# Patient Record
Sex: FEMALE | Race: WHITE | NOT HISPANIC OR LATINO | Employment: FULL TIME | URBAN - METROPOLITAN AREA
[De-identification: names, ages, dates, MRNs, and addresses within clinical notes are randomized per-mention and may not be internally consistent; named-entity substitution may affect disease eponyms.]

---

## 2023-09-07 ENCOUNTER — APPOINTMENT (OUTPATIENT)
Dept: ULTRASOUND IMAGING | Facility: HOSPITAL | Age: 23
End: 2023-09-07
Payer: COMMERCIAL

## 2023-09-07 ENCOUNTER — HOSPITAL ENCOUNTER (EMERGENCY)
Facility: HOSPITAL | Age: 23
Discharge: HOME | End: 2023-09-07
Attending: EMERGENCY MEDICINE
Payer: COMMERCIAL

## 2023-09-07 VITALS
DIASTOLIC BLOOD PRESSURE: 64 MMHG | TEMPERATURE: 98.3 F | WEIGHT: 174.6 LBS | BODY MASS INDEX: 25.86 KG/M2 | HEIGHT: 69 IN | HEART RATE: 59 BPM | RESPIRATION RATE: 20 BRPM | SYSTOLIC BLOOD PRESSURE: 105 MMHG | OXYGEN SATURATION: 100 %

## 2023-09-07 DIAGNOSIS — Z34.90 PREGNANCY AT EARLY STAGE: Primary | ICD-10-CM

## 2023-09-07 DIAGNOSIS — R10.2 PELVIC CRAMPING: ICD-10-CM

## 2023-09-07 DIAGNOSIS — O21.9 VOMITING AFFECTING PREGNANCY: ICD-10-CM

## 2023-09-07 PROCEDURE — 76817 TRANSVAGINAL US OBSTETRIC: CPT

## 2023-09-07 PROCEDURE — 99284 EMERGENCY DEPT VISIT MOD MDM: CPT

## 2023-09-07 PROCEDURE — 63710000001 ONDANSETRON ODT 4 MG TABLET DISPERSIBLE: Performed by: EMERGENCY MEDICINE

## 2023-09-07 RX ORDER — ONDANSETRON 4 MG/1
4 TABLET, ORALLY DISINTEGRATING ORAL 4 TIMES DAILY PRN
Qty: 15 TABLET | Refills: 0 | Status: SHIPPED | OUTPATIENT
Start: 2023-09-07

## 2023-09-07 RX ORDER — ACETAMINOPHEN 325 MG/1
650 TABLET ORAL ONCE
Status: COMPLETED | OUTPATIENT
Start: 2023-09-07 | End: 2023-09-07

## 2023-09-07 RX ORDER — ONDANSETRON 4 MG/1
4 TABLET, ORALLY DISINTEGRATING ORAL ONCE
Status: COMPLETED | OUTPATIENT
Start: 2023-09-07 | End: 2023-09-07

## 2023-09-07 RX ADMIN — ONDANSETRON 4 MG: 4 TABLET, ORALLY DISINTEGRATING ORAL at 23:02

## 2023-09-07 RX ADMIN — ACETAMINOPHEN 650 MG: 325 TABLET ORAL at 22:45

## 2023-09-07 NOTE — Clinical Note
[facility] EMERGENCY ROOM  [address]  [place] [address]  Phone: [phone]    Eddy Das was seen and treated in our emergency department on 9/7/2023.  She may return to work on 09/09/2023.         Thank you for choosing [facility].    Chase Benavidez,

## 2023-09-08 NOTE — DISCHARGE INSTRUCTIONS
Ultrasound shows a single viable intrauterine gestation measuring 6 weeks 4 days.    Take Zofran as needed for nausea or vomiting.    Tylenol for pain.    Follow-up with OB/GYN of choice.    Return for new or worsening symptoms

## 2023-09-08 NOTE — ED PROVIDER NOTES
Time: 9:35 PM EDT  Date of encounter:  9/7/2023  Independent Historian/Clinical History and Information was obtained by:   Patient    History is limited by: N/A    Chief Complaint: Pregnancy problem      History of Present Illness:  Patient is a 23 y.o. year old female who presents to the emergency department for evaluation of patient reports for the last week she has had some low abdominal pain and cramping almost feels the belt-like around her low abdomen is being squeezed.  Patient has had some intermittent nausea and vomiting.  The symptoms have worsened and become more frequent in the last 2 days so patient did take it at home pregnancy test and it was positive.  She believes she is around 6 weeks pregnant.  She went to the emergency department at [place] today and they did blood work which verified pregnancy but they did not have ultrasound available so they sent her here to rule out ectopic.  Patient denies any vaginal bleeding or discharge.  No diarrhea.  No dysuria.  No fever.  Patient is G2, P1    HPI    Patient Care Team  Primary Care Provider: Provider, No Known    Past Medical History:     No Known Allergies  History reviewed. No pertinent past medical history.  History reviewed. No pertinent surgical history.  History reviewed. No pertinent family history.    Home Medications:  Prior to Admission medications    Not on File        Social History:          Review of Systems:  Review of Systems   Constitutional:  Negative for chills and fever.   HENT:  Negative for congestion, ear pain and sore throat.    Eyes:  Negative for pain.   Respiratory:  Negative for cough, chest tightness and shortness of breath.    Cardiovascular:  Negative for chest pain.   Gastrointestinal:  Positive for nausea and vomiting. Negative for abdominal pain and diarrhea.   Genitourinary:  Positive for pelvic pain. Negative for dysuria, flank pain, hematuria, vaginal bleeding and vaginal discharge.   Musculoskeletal:  Negative  "for joint swelling.   Skin:  Negative for pallor.   Neurological:  Negative for seizures and headaches.   Hematological: Negative.    Psychiatric/Behavioral: Negative.     All other systems reviewed and are negative.     Physical Exam:  /64   Pulse 59   Temp 98.3 °F (36.8 °C) (Oral)   Resp 20   Ht 175.3 cm (69\")   Wt 79.2 kg (174 lb 9.7 oz)   LMP 07/21/2023 (Exact Date)   SpO2 100%   Breastfeeding No   BMI 25.78 kg/m²     Physical Exam  Vitals and nursing note reviewed.   Constitutional:       General: She is not in acute distress.     Appearance: Normal appearance. She is not toxic-appearing.   HENT:      Head: Normocephalic and atraumatic.      Nose: Nose normal.      Mouth/Throat:      Mouth: Mucous membranes are moist.   Eyes:      General: No scleral icterus.     Conjunctiva/sclera: Conjunctivae normal.   Cardiovascular:      Rate and Rhythm: Normal rate and regular rhythm.      Heart sounds: Normal heart sounds.   Pulmonary:      Effort: Pulmonary effort is normal. No respiratory distress.      Breath sounds: Normal breath sounds.   Abdominal:      General: Abdomen is flat. Bowel sounds are normal.      Palpations: Abdomen is soft.      Tenderness: There is no abdominal tenderness.   Genitourinary:     Comments: No active bleeding  Musculoskeletal:         General: Normal range of motion.      Cervical back: Normal range of motion and neck supple.   Skin:     General: Skin is warm and dry.   Neurological:      Mental Status: She is alert and oriented to person, place, and time.   Psychiatric:         Mood and Affect: Mood normal.         Behavior: Behavior normal.                  Medical Decision Making:      Comorbidities that affect care:    None    External Notes reviewed:    Previous Labs: Lab work from [facility] reviewed showed a positive pregnancy test, O+ blood, and a negative urinalysis.  No acute findings on CBC or CMP      The following orders were placed and all results were " independently analyzed by me:  Orders Placed This Encounter   Procedures    US Ob Transvaginal    ABO / Rh       Medications Given in the Emergency Department:  Medications   acetaminophen (TYLENOL) tablet 650 mg (650 mg Oral Given 9/7/23 2245)   ondansetron ODT (ZOFRAN-ODT) disintegrating tablet 4 mg (4 mg Oral Given 9/7/23 2302)        ED Course:    ED Course as of 09/08/23 0025   Thu Sep 07, 2023   2342 US Ob Transvaginal  Single viable intrauterine gestation 6 weeks 4 days [DS]      ED Course User Index  [DS] Brina Springer APRN       Labs:    Lab Results (last 24 hours)       Procedure Component Value Units Date/Time    HCG, QUANTITATIVE, PREGNANCY [[id number]]  (Abnormal) Collected: 09/07/23 1800     Updated: 09/07/23 2052    Urinalysis With Microscopic - [[id number]]  (Abnormal) Collected: 09/07/23 1801     Updated: 09/07/23 2052    Urinalysis, Microscopic Only - [[id number]]  (Abnormal) Collected: 09/07/23 1801     Updated: 09/07/23 2052    Pregnancy, Urine - [[id number]]  (Abnormal) Collected: 09/07/23 1802     Updated: 09/07/23 2052             Imaging:    US Ob Transvaginal    Result Date: 9/7/2023  PROCEDURE: US OB TRANSVAGINAL  COMPARISON: None.  INDICATIONS:  23-year-old female who is currently pregnant with unspecified lower abdominal pain; the quantitative beta-hCG is 45,766 mIU/mL.  The last menstrual period (LMP) date is 7/21/2023.   TECHNIQUE:  Ultrasound examination of the pelvis was performed, using endovaginal/transvaginal technique.  A limited obstetric survey was performed.  FINDINGS:  Two-dimensional grayscale images as well as color and spectral Doppler analysis are provided for review.  Again, transvaginal/endovaginal technique was utilized.  A single viable intrauterine gestation is identified with a composite gestational age of 6 weeks, 4 days and an estimated due date of 4/20/2024.  The mean gestational sac diameter (MSD) is not provided.  The mean crown rump length (CRL) of 0.72 cm predicts  a gestational age of 6 weeks, 4 days.  The yolk sac is seen in the gestational sac and measures 3.1 mm in maximum diameter.  The fetal heart rate is 1 5 9 BPM.  The clinical gestational age is 6 weeks, 6 days.  The estimated delivery date clinically is 4/26/2024.  These values are concordant.  There is an age-indeterminate perigestational hemorrhage identified; it is estimated at 1.5 x 0.6 x 1.3 cm in craniocaudal, anteroposterior (AP), and transverse extent, respectively.  It has an estimated total volume is 0.57 mL.  The finding is located inferior to the gestational sac and probably just to the maternal right.  Otherwise, no suspicious uterine or adnexal masses are seen.  The endocervical canal is nondilated and measures 3.7 cm in length.  Minimal, if any, free fluid is identified in the cul-de-sac.  There is a suspected right-sided corpus luteal cyst, which measures about 2 cm in greatest dimension.  Normal follicles involve the bilateral ovaries.  No ovarian torsion is identified.  That is, there is normal blood flow involving the bilateral ovaries by duplex ultrasound examination.  The uterus measures 7.8 x 4.4 x 5.7 cm.  The right ovary measures 4 x 2.5 x 2.1 cm.  The left ovary measures 4.4 x 2 x 2.2 cm.  The uterine volume is 100.8 mL.  The right ovarian volume is 11.3 mL.  The left ovarian volume is 10.6 mL.        1. There is a single viable intrauterine gestation with a composite gestational age 6 weeks, 4 days by ultrasound.  The estimated due date by ultrasound is 4/28/2024.  The fetal heart rate is 1 5 9 BPM.  2. There is an age-indeterminate perigestational hemorrhage, which measures about 0.57 mL. 3. Otherwise, no suspicious uterine or adnexal mass.  4. Consider close interval clinical, lab, and imaging follow-up to ensure normal fetal growth and development and to further assess the placental location.  5. No ovarian torsion is identified, bilaterally. 6. Minimal (if any) free fluid is seen in the  cul-de-sac. 7. The other findings are as detailed above.    Please note that portions of this note were completed with a voice recognition program.  NHAN FLAHERTY JR, MD       Electronically Signed and Approved By: NHAN FLAHERTY JR, MD on 9/07/2023 at 23:27                 Differential Diagnosis and Discussion:    Pelvic Pain: Differential diagnosis includes but is not limited to ectopic pregnancy, ovarian torsion, tubo-ovarian abscess, ovarian cyst, ovulation, oophoritis, abdominal pregnancy, appendicitis, diverticulitis, cystitis, and renal colic    Ultrasound impression was interpreted by me.     MDM  Number of Diagnoses or Management Options  Pelvic cramping  Pregnancy at early stage  Vomiting affecting pregnancy  Diagnosis management comments: The patient is resting comfortably and feels better, is alert and in no distress. Repeat examination is unremarkable and benign; in particular, there's no discomfort at McBurney's point and there is no pulsatile mass. The history, exam, diagnostic testing, and current condition does not suggest acute appendicitis, bowel obstruction, acute cholecystitis, bowel perforation, major gastrointestinal bleeding, severe diverticulitis, abdominal aortic aneurysm, mesenteric ischemia, volvulus, sepsis, or other significant pathology that warrants further testing, continued ED treatment, admission, for surgical evaluation at this point. The vital signs have been stable. The patient does not have uncontrollable pain, intractable vomiting, or other significant symptoms. The patient's condition is stable and appropriate for discharge from the emergency department.          Amount and/or Complexity of Data Reviewed  Clinical lab tests: ordered and reviewed  Tests in the radiology section of CPT®: reviewed and ordered  Tests in the medicine section of CPT®: ordered and reviewed    Risk of Complications, Morbidity, and/or Mortality  Presenting problems: low  Diagnostic procedures:  low  Management options: low    Patient Progress  Patient progress: stable         Patient Care Considerations:    CONSULT: I considered consulting gynecology, however no acute emergent need.  Single viable intrauterine gestation was noted and patient can follow-up outpatient      Consultants/Shared Management Plan:    None    Social Determinants of Health:    Patient is independent, reliable, and has access to care.       Disposition and Care Coordination:    Discharged: The patient is suitable and stable for discharge with no need for consideration of observation or admission.    I have explained the patient´s condition, diagnoses and treatment plan based on the information available to me at this time. I have answered questions and addressed any concerns. The patient has a good  understanding of the patient´s diagnosis, condition, and treatment plan as can be expected at this point. The vital signs have been stable. The patient´s condition is stable and appropriate for discharge from the emergency department.      The patient will pursue further outpatient evaluation with the primary care physician or other designated or consulting physician as outlined in the discharge instructions. They are agreeable to this plan of care and follow-up instructions have been explained in detail. The patient has received these instructions in written format and have expressed an understanding of the discharge instructions. The patient is aware that any significant change in condition or worsening of symptoms should prompt an immediate return to this or the closest emergency department or call to 911.  I have explained discharge medications and the need for follow up with the patient/caretakers. This was also printed in the discharge instructions. Patient was discharged with the following medications and follow up:      Medication List        New Prescriptions      ondansetron ODT 4 MG disintegrating tablet  Commonly known as:  ZOFRAN-ODT  Place 1 tablet on the tongue 4 (Four) Times a Day As Needed for Nausea or Vomiting.               Where to Get Your Medications        These medications were sent to [facility] ([place]) - [place] - [address] - [phone]  - [phone]  [address], [facility] [address]      Phone: [phone]   ondansetron ODT 4 MG disintegrating tablet      your obgyn    Schedule an appointment as soon as possible for a visit          Final diagnoses:   Pregnancy at early stage   Vomiting affecting pregnancy   Pelvic cramping        ED Disposition       ED Disposition   Discharge    Condition   Stable    Comment   --               This medical record created using voice recognition software.             Brina Springer, APRN  09/08/23 0025

## 2023-09-08 NOTE — ED PROVIDER NOTES
Time: 9:10 PM EDT  Date of encounter:  9/7/2023  Independent Historian/Clinical History and Information was obtained by:   {Blank multiple:43268}    History is limited by: {Limited History:32589}    Chief Complaint: ***      History of Present Illness:  Patient is a 23 y.o. year old female who presents to the emergency department for evaluation of ***    HPI    Patient Care Team  Primary Care Provider: No primary care provider on file.    Past Medical History:     Not on File  No past medical history on file.  No past surgical history on file.  No family history on file.    Home Medications:  Prior to Admission medications    Not on File        Social History:          Review of Systems:  Review of Systems     Physical Exam:  There were no vitals taken for this visit.    Physical Exam   ***          Procedures:  Procedures      Medical Decision Making:      Comorbidities that affect care:    {Comorbidities that affect care:80947}    External Notes reviewed:    {External Note review (Optional):42963}      The following orders were placed and all results were independently analyzed by me:  No orders of the defined types were placed in this encounter.      Medications Given in the Emergency Department:  Medications - No data to display     ED Course:         Labs:    Lab Results (last 24 hours)       Procedure Component Value Units Date/Time    HCG, QUANTITATIVE, PREGNANCY [[id number]]  (Abnormal) Collected: 09/07/23 1800     Updated: 09/07/23 2052    Urinalysis With Microscopic - [[id number]]  (Abnormal) Collected: 09/07/23 1801     Updated: 09/07/23 2052    Urinalysis, Microscopic Only - [[id number]]  (Abnormal) Collected: 09/07/23 1801     Updated: 09/07/23 2052    Pregnancy, Urine - [[id number]]  (Abnormal) Collected: 09/07/23 1802     Updated: 09/07/23 2052             Imaging:    No Radiology Exams Resulted Within Past 24 Hours      Differential Diagnosis and Discussion:    {Differentials:80085}    {Independent  Review of (Optional):01263}    MDM     {Critical Care:38913}    Patient Care Considerations:    {Considerations (Optional):17844}      Consultants/Shared Management Plan:    {Shared Management Plan (Optional):60740}    Social Determinants of Health:    {Social Determinants of Health (Optional):93004}      Disposition and Care Coordination:    {Admission consideration:84091}    {Discharge (Optional):41135}    Final diagnoses:   None        ED Disposition       None            This medical record created using voice recognition software.